# Patient Record
Sex: MALE | Race: WHITE | NOT HISPANIC OR LATINO
[De-identification: names, ages, dates, MRNs, and addresses within clinical notes are randomized per-mention and may not be internally consistent; named-entity substitution may affect disease eponyms.]

---

## 2024-05-06 PROBLEM — Z00.00 ENCOUNTER FOR PREVENTIVE HEALTH EXAMINATION: Status: ACTIVE | Noted: 2024-05-06

## 2024-05-07 ENCOUNTER — APPOINTMENT (OUTPATIENT)
Dept: ORTHOPEDIC SURGERY | Facility: CLINIC | Age: 34
End: 2024-05-07
Payer: OTHER MISCELLANEOUS

## 2024-05-07 VITALS — HEIGHT: 71 IN | BODY MASS INDEX: 29.4 KG/M2 | WEIGHT: 210 LBS

## 2024-05-07 DIAGNOSIS — Z78.9 OTHER SPECIFIED HEALTH STATUS: ICD-10-CM

## 2024-05-07 DIAGNOSIS — M72.2 PLANTAR FASCIAL FIBROMATOSIS: ICD-10-CM

## 2024-05-07 DIAGNOSIS — Z87.891 PERSONAL HISTORY OF NICOTINE DEPENDENCE: ICD-10-CM

## 2024-05-07 PROCEDURE — 73630 X-RAY EXAM OF FOOT: CPT | Mod: RT

## 2024-05-07 PROCEDURE — 99204 OFFICE O/P NEW MOD 45 MIN: CPT

## 2024-05-07 NOTE — ASSESSMENT
[FreeTextEntry1] : 35 yo male presenting with right plantar fasciitis. X-rays negative for fractures or abnormalities. Recommend non-surgical managemnet -Discussed with patient that right plantar fascial CSI not recommend due to increased risk of calcaneal stress fractures due to thinning of calcaneal fat pad. Patient understands. -Rx plantar fasciitis night time splint given -Demonstrated plantar fascia stretching/strengthening exercises with patient -Rx PT given today -patient may continue to work full duty without restrictions at this time -Discussed risks and benefits of plantar fascial PRP injections. Discussed with patient that if conservative management fails that patient may be indicated for PRP. -F/u 2 months  The diagnosis was explained in detail. The potential non-surgical and surgical treatments were reviewed. The relative risks and benefits of each option were considered relative to the patients age, activity level, medical history, symptom severity and previously attempted treatments.  The patient was advised to consult with their primary medical provider prior to initiation of any new medications to reduce the risk of adverse effects specific to their long-term home medications and medical history. The risk of gastrointestinal irritation and kidney injury specific to long-term NSAID use was discussed.  Entered by Roosevelt Garcia PA-C acting as scribe. Dr. Leggett Attestation The documentation recorded by the scribe, in my presence, accurately reflects the service I, Dr. Leggett, personally performed, and the decisions made by me with my edits as appropriate.

## 2024-05-07 NOTE — HISTORY OF PRESENT ILLNESS
[Sudden] : sudden [9] : 9 [6] : 6 [Stabbing] : stabbing [Intermittent] : intermittent [Rest] : rest [Meds] : meds [Full time] : Work status: full time [de-identified] : Patient is here today for right heel. Pain began 4/29/24. Patient reports that he was walking to his truck with tools when misstepped causing pain in his right heel. Patient reports that her was wearing proper PPE during incident. Patient reporting intermittent stabbing pain in the heel that is worse with walking and standing. Patient denies taking medications for the pain. Patient has continued to work since the incident. [] : Post Surgical Visit: no [FreeTextEntry1] : Right heel [FreeTextEntry3] : 4/29/24 [FreeTextEntry5] : Patient reports that he was walking to his carrying tools when he misstepped causing pain in his heel. [de-identified] : movement [de-identified] : [place]

## 2024-05-07 NOTE — PHYSICAL EXAM
[de-identified] : Examination of the right foot and ankle is as follows: INSPECTION: pes cavus alignment, no abrasion, no laceration, no swelling, no erythema, no ecchymosis PALPATION: plantar fascia tenderness, plantar heels tenderness, no ttp over posterior tibial tendon ROM: dorsiflexion 20 degrees, plantar flexion 40 degrees, inversion 30 degrees, eversion 20 degrees STRENGTH: dorsiflexion 5/5. plantarflexion 5/5, inversion 5/5, eversion 5/5, EHL 5/5, FHL 5/5 VASCULAR: dorsalis pedis pulse: 2+, posterior tibialis pulse 2+ NEURO: sensation present to light touch in all distributions GAIT: mildly antalgic, but patient ambulates without assistive device.   X-rays of the right foot is as follows: Foot 3 View: There are no fractures, subluxations or dislocations. No significant abnormalities are seen.

## 2024-05-07 NOTE — WORK
[Sprain/Strain] : sprain/strain [Was the competent medical cause of the injury] : was the competent medical cause of the injury [Are consistent with the injury] : are consistent with the injury [Consistent with my objective findings] : consistent with my objective findings [Does not reveal pre-existing condition(s) that may affect treatment/prognosis] : does not reveal pre-existing condition(s) that may affect treatment/prognosis [Can return to work without limitations on ______] : can return to work without limitations on [unfilled] [Patient] : patient [No Rx restrictions] : No Rx restrictions. [I provided the services listed above] :  I provided the services listed above. [Very Heavy Work:] : Very Heavy Work: Exerting in excess of 100 pounds of force occasionally, and/or in excess of 50 pounds of force frequently, and/or in excess of 20 pounds of force constantly to move objects. Physical demand requirements are in excess of those for Heavy Work

## 2024-07-09 ENCOUNTER — APPOINTMENT (OUTPATIENT)
Dept: ORTHOPEDIC SURGERY | Facility: CLINIC | Age: 34
End: 2024-07-09
Payer: OTHER MISCELLANEOUS

## 2024-07-09 DIAGNOSIS — M72.2 PLANTAR FASCIAL FIBROMATOSIS: ICD-10-CM

## 2024-07-09 PROCEDURE — 99213 OFFICE O/P EST LOW 20 MIN: CPT

## 2024-08-28 ENCOUNTER — APPOINTMENT (OUTPATIENT)
Dept: HUMAN REPRODUCTION | Facility: CLINIC | Age: 34
End: 2024-08-28

## 2025-02-07 ENCOUNTER — APPOINTMENT (OUTPATIENT)
Dept: HUMAN REPRODUCTION | Facility: CLINIC | Age: 35
End: 2025-02-07
Payer: COMMERCIAL

## 2025-02-07 PROCEDURE — 36415 COLL VENOUS BLD VENIPUNCTURE: CPT

## 2025-02-21 ENCOUNTER — TRANSCRIPTION ENCOUNTER (OUTPATIENT)
Age: 35
End: 2025-02-21

## 2025-04-28 ENCOUNTER — APPOINTMENT (OUTPATIENT)
Dept: HUMAN REPRODUCTION | Facility: CLINIC | Age: 35
End: 2025-04-28
Payer: COMMERCIAL

## 2025-04-28 PROCEDURE — ZZZZZ: CPT
